# Patient Record
Sex: MALE | Race: OTHER | HISPANIC OR LATINO | Employment: UNEMPLOYED | URBAN - METROPOLITAN AREA
[De-identification: names, ages, dates, MRNs, and addresses within clinical notes are randomized per-mention and may not be internally consistent; named-entity substitution may affect disease eponyms.]

---

## 2023-06-12 ENCOUNTER — HOSPITAL ENCOUNTER (EMERGENCY)
Facility: HOSPITAL | Age: 23
Discharge: HOME | End: 2023-06-12
Attending: EMERGENCY MEDICINE | Admitting: EMERGENCY MEDICINE

## 2023-06-12 VITALS
RESPIRATION RATE: 18 BRPM | DIASTOLIC BLOOD PRESSURE: 84 MMHG | TEMPERATURE: 97.6 F | OXYGEN SATURATION: 100 % | HEART RATE: 84 BPM | WEIGHT: 155.1 LBS | SYSTOLIC BLOOD PRESSURE: 145 MMHG

## 2023-06-12 DIAGNOSIS — L23.5 ALLERGIC DERMATITIS DUE TO OTHER CHEMICAL PRODUCT: Primary | ICD-10-CM

## 2023-06-12 RX ORDER — LORATADINE 10 MG/1
10 TABLET ORAL ONCE
Status: COMPLETED | OUTPATIENT
Start: 2023-06-12 | End: 2023-06-12

## 2023-06-12 RX ORDER — PREDNISONE 20 MG/1
40 TABLET ORAL ONCE
Status: COMPLETED | OUTPATIENT
Start: 2023-06-12 | End: 2023-06-12

## 2023-06-12 RX ORDER — LORATADINE 10 MG/1
10 TABLET ORAL DAILY
Qty: 20 TABLET | Refills: 0 | Status: SHIPPED | OUTPATIENT
Start: 2023-06-12

## 2023-06-12 RX ORDER — PREDNISONE 20 MG/1
40 TABLET ORAL DAILY
Qty: 8 TABLET | Refills: 0 | Status: SHIPPED | OUTPATIENT
Start: 2023-06-13 | End: 2023-06-17

## 2023-06-12 RX ORDER — DIAPER,BRIEF,INFANT-TODD,DISP
EACH MISCELLANEOUS
Qty: 15 G | Refills: 0 | Status: SHIPPED | OUTPATIENT
Start: 2023-06-12

## 2023-06-12 RX ADMIN — LORATADINE 10 MG: 10 TABLET ORAL at 16:05

## 2023-06-12 RX ADMIN — PREDNISONE 40 MG: 20 TABLET ORAL at 16:05

## 2023-06-12 NOTE — ED PROVIDER NOTES
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  Has using topical creams without significant relief  Denies fever or other systemic symptoms  Denies rash elsewhere  Is unsure what type of gloves he uses   used: Yes ([employer])        None       Past Medical History:   Diagnosis Date   • Asthma        History reviewed  No pertinent surgical history  History reviewed  No pertinent family history  I have reviewed and agree with the history as documented  E-Cigarette/Vaping   • E-Cigarette Use Never User      E-Cigarette/Vaping Substances     Social History     Tobacco Use   • Smoking status: Never   • Smokeless tobacco: Never   Vaping Use   • Vaping Use: Never used   Substance Use Topics   • Alcohol use: Yes     Comment: social   • Drug use: Yes     Types: Marijuana       Review of Systems   Constitutional: Negative for chills and fever  HENT: Negative for congestion, rhinorrhea and sore throat  Eyes: Negative for pain, discharge and visual disturbance  Respiratory: Negative for cough and shortness of breath  Cardiovascular: Negative for chest pain  Gastrointestinal: Negative for abdominal pain, diarrhea, nausea and vomiting  Musculoskeletal: Negative for arthralgias and myalgias  Skin: Positive for rash  Negative for color change  Neurological: Negative for weakness  Hematological: Does not bruise/bleed easily         Physical Exam  Physical Exam  Vitals and nursing note reviewed  Constitutional:       General: He is not in acute distress  Appearance: Normal appearance  He is not ill-appearing, toxic-appearing or diaphoretic  HENT:      Head: Normocephalic and atraumatic  Nose: Nose normal       Mouth/Throat:      Mouth: Mucous membranes are moist    Eyes:      General: No scleral icterus  Right eye: No discharge  Left eye: No discharge  Conjunctiva/sclera: Conjunctivae normal    Cardiovascular:      Rate and Rhythm: Normal rate and regular rhythm  Pulmonary:      Effort: Pulmonary effort is normal       Breath sounds: Normal breath sounds  No wheezing, rhonchi or rales  Abdominal:      General: There is no distension  Palpations: Abdomen is soft  Tenderness: There is no abdominal tenderness  There is no guarding or rebound  Musculoskeletal:         General: Swelling (bilateral hands) present  No tenderness or deformity  Cervical back: Neck supple  Comments: Intact flexion and extension of all 10 digits at the MCP, PIP, DIP joints  Good distal pulses and capillary refill in the bilateral upper extremities  Skin:     General: Skin is warm and dry  Comments: Patient with erythematous scaly raised rash to the bilateral dorsal aspects of the hands with mild swelling   Neurological:      General: No focal deficit present  Mental Status: He is alert and oriented to person, place, and time     Psychiatric:         Mood and Affect: Mood normal          Behavior: Behavior normal          Vital Signs  ED Triage Vitals [06/12/23 1540]   Temperature Pulse Respirations Blood Pressure SpO2   97 6 °F (36 4 °C) 84 18 145/84 100 %      Temp Source Heart Rate Source Patient Position - Orthostatic VS BP Location FiO2 (%)   Tympanic -- -- -- --      Pain Score       8           Vitals:    06/12/23 1540   BP: 145/84   Pulse: 84         Visual Acuity      ED Medications  Medications   loratadine (CLARITIN) tablet 10 mg (10 mg Oral Given 6/12/23 1605)   predniSONE tablet 40 mg (40 mg Oral Given 6/12/23 1605)       Diagnostic Studies  Results Reviewed     None                 No orders to display              Procedures  Procedures         ED Course                               SBIRT 22yo+    Flowsheet Row Most Recent Value   Initial Alcohol Screen: US AUDIT-C     1  How often do you have a drink containing alcohol? 0 Filed at: 06/12/2023 1543   2  How many drinks containing alcohol do you have on a typical day you are drinking? 0 Filed at: 06/12/2023 1543   3a  Male UNDER 65: How often do you have five or more drinks on one occasion? 0 Filed at: 06/12/2023 1543   3b  FEMALE Any Age, or MALE 65+: How often do you have 4 or more drinks on one occassion? 0 Filed at: 06/12/2023 1543   Audit-C Score 0 Filed at: 06/12/2023 1543   FILIPE: How many times in the past year have you    Used an illegal drug or used a prescription medication for non-medical reasons? Never Filed at: 06/12/2023 1543                    Medical Decision Making  76-year-old male presenting for evaluation of rash and swelling to the bilateral hands  Patient's physical examination is consistent with contact dermatitis  Does not appear to be infectious or related to a mite  No evidence of superinfection or concern for septic arthritis  Suspect patient has chronic irritation either from the gloves or from washing dishes that is exacerbated over the last several months  Patient is also at risk for eczema with his history of asthma  Will initiate daily Claritin and 5 days of prednisone to assist with swelling  Patient given hydrocortisone cream to use topically twice a day for 7 days  Patient also advised to utilize Aquaphor for hydration and moisturization after this acute exacerbation is improved  Placed an ambulatory referral to family practice for follow-up and to establish care; if patient does not improve, can be referred to dermatology    Patient advised to use nitrile gloves and to avoid latex and vinyl as this is likely an allergy and will have worsening reactions over time  Return precautions thoroughly discussed  Patient is in agreement and understanding of these instructions  Risk  OTC drugs  Prescription drug management  Disposition  Final diagnoses: Allergic dermatitis due to other chemical product     Time reflects when diagnosis was documented in both MDM as applicable and the Disposition within this note     Time User Action Codes Description Comment    6/12/2023  4:02 PM Ariane Boyle Add [L23 5] Allergic dermatitis due to other chemical product       ED Disposition     ED Disposition   Discharge    Condition   Stable    Date/Time   Mon Jun 12, 2023  4:01 PM    [facility] discharge to home/self care                 Follow-up Information     Follow up With Specialties Details Why [address] [facility] Schedule an appointment as soon as possible for a visit   [address] [facility]  [phone]            Discharge Medication List as of 6/12/2023  4:05 PM      START taking these medications    Details   hydrocortisone 1 % cream Apply to affected area 2 times daily for 7 days, Normal      loratadine (CLARITIN) 10 mg tablet Take 1 tablet (10 mg total) by mouth daily, Starting Mon 6/12/2023, Normal      predniSONE 20 mg tablet Take 2 tablets (40 mg total) by mouth daily for 4 days Do not start before June 13, 2023 , Starting Tue 6/13/2023, Until Sat 6/17/2023, Normal                 PDMP Review     None          ED Provider  Electronically Signed by           Adam Smith MD  06/12/23 4398